# Patient Record
Sex: MALE | Race: WHITE | NOT HISPANIC OR LATINO | Employment: STUDENT | URBAN - METROPOLITAN AREA
[De-identification: names, ages, dates, MRNs, and addresses within clinical notes are randomized per-mention and may not be internally consistent; named-entity substitution may affect disease eponyms.]

---

## 2018-05-29 ENCOUNTER — HOSPITAL ENCOUNTER (EMERGENCY)
Facility: HOSPITAL | Age: 23
Discharge: HOME | End: 2018-05-29
Attending: EMERGENCY MEDICINE

## 2018-05-29 VITALS
HEART RATE: 66 BPM | TEMPERATURE: 98.4 F | OXYGEN SATURATION: 98 % | WEIGHT: 155.87 LBS | SYSTOLIC BLOOD PRESSURE: 165 MMHG | DIASTOLIC BLOOD PRESSURE: 90 MMHG | RESPIRATION RATE: 18 BRPM

## 2018-05-29 DIAGNOSIS — L72.9 FOLLICULAR CYST OF SKIN: Primary | ICD-10-CM

## 2018-05-29 PROCEDURE — 99282 EMERGENCY DEPT VISIT SF MDM: CPT

## 2018-05-29 RX ORDER — DOXYCYCLINE HYCLATE 100 MG/1
100 CAPSULE ORAL 2 TIMES DAILY
Qty: 14 CAPSULE | Refills: 0 | Status: SHIPPED | OUTPATIENT
Start: 2018-05-29 | End: 2018-06-05

## 2018-05-29 NOTE — ED PROVIDER NOTES
History  Chief Complaint   Patient presents with    Cyst     pt presents ambulatory with c/o pilonoidal cyst x 2 months  reports it is draining      HPI patient is a 51-year-old male, he reports the tender area and areas tailbone, reports over the last 2 months he noticed a pimple type structure there, he reports now it is draining  Patient reports he got some purulent material out of it on his underwear  He denies any fever or chills  Denies any pain with sitting in that area  Denies any fullness around his rectum  Denies any perirectal swelling  Past medical history previously healthy  Family history noncontributory  Social history nonsmoker no history of drug abuse  None       History reviewed  No pertinent past medical history  History reviewed  No pertinent surgical history  History reviewed  No pertinent family history  I have reviewed and agree with the history as documented  Social History   Substance Use Topics    Smoking status: Never Smoker    Smokeless tobacco: Never Used    Alcohol use Not on file        Review of Systems   Constitutional: Negative for fever  HENT: Negative for congestion  Eyes: Negative for pain and redness  Respiratory: Negative for cough and shortness of breath  Cardiovascular: Negative for chest pain  Gastrointestinal: Negative for abdominal pain and vomiting  Skin: Negative for color change and pallor  Patient reports a lump on his tailbone    Physical Exam  Physical Exam   Constitutional: He is oriented to person, place, and time  He appears well-developed and well-nourished  HENT:   Head: Normocephalic  Right Ear: External ear normal    Left Ear: External ear normal    Nose: Nose normal    Mouth/Throat: Oropharynx is clear and moist    Eyes: EOM and lids are normal  Pupils are equal, round, and reactive to light  Neck: Normal range of motion  Neck supple  Pulmonary/Chest: Effort normal  No respiratory distress     Musculoskeletal: Normal range of motion  He exhibits no deformity  Neurological: He is alert and oriented to person, place, and time  Skin: Skin is warm and dry  There is a small follicular pimple type structure less than 1 cm x 1 cm on the patient's pilonidal area, no abscess palpable, area is completely draining, discussed with patient consistent with a follicular cyst, no sign of cellulitis  No sign of perirectal abscess  Psychiatric: He has a normal mood and affect  Nursing note and vitals reviewed  Vital Signs  ED Triage Vitals [05/29/18 1250]   Temperature Pulse Respirations Blood Pressure SpO2   98 4 °F (36 9 °C) 66 18 165/90 98 %      Temp Source Heart Rate Source Patient Position - Orthostatic VS BP Location FiO2 (%)   Oral Monitor -- -- --      Pain Score       --           Vitals:    05/29/18 1250   BP: 165/90   Pulse: 66       Visual Acuity      ED Medications  Medications - No data to display    Diagnostic Studies  Results Reviewed     None                 No orders to display              Procedures  Procedures       Phone Contacts  ED Phone Contact    ED Course                               MDM medical decision making 61-year-old male presents with a pimple type structure on his pilonidal area consistent with a small follicular cyst, there is good drainage, discussed treatment with antibiotics, discussed warm soaking, discussed follow-up with surgery for excision if needed  No indication for incision and drainage at this time  CritCare Time    Disposition  Final diagnoses: Follicular cyst of skin     Time reflects when diagnosis was documented in both MDM as applicable and the Disposition within this note     Time User Action Codes Description Comment    5/29/2018 12:54 PM Laura Roland Add [X03 8] Follicular cyst of skin       ED Disposition     ED Disposition Condition Comment    Discharge  Mayra Brooks discharge to home/self care      Condition at discharge: Good        Follow-up Information Follow up With Specialties Details Why MD [facility]   [address] [facility] [address] [address] [phone]            Discharge Medication List as of 5/29/2018 12:55 PM      START taking these medications    Details   doxycycline hyclate (VIBRAMYCIN) 100 mg capsule Take 1 capsule (100 mg total) by mouth 2 (two) times a day for 7 days, Starting Tue 5/29/2018, Until Tue 6/5/2018, Print           No discharge procedures on file      ED Provider  Electronically Signed by           Wiley Perez MD  05/29/18 1640

## 2018-05-29 NOTE — DISCHARGE INSTRUCTIONS
Wash well in the shower or in the tub  Allow anything that wants to come out to come out  Doxycycline twice daily to prevent reinfection or cellulitis  Return increasing redness swelling or can follow up with General surgery for cyst removal         Cyst   WHAT YOU NEED TO KNOW:   A cyst is a round, firm lump found almost anywhere on your body  Cysts may grow slowly but are not cancerous  Treatment is not needed if you have no symptoms  Cysts can be opened and drained if they become infected or cause problems  Cysts can grow larger and make it hard for you to do your daily activities  You may also need antibiotics if there is an infection  You may need surgery to remove the cyst completely  DISCHARGE INSTRUCTIONS:   Medicines:   · Antibiotics  may be given to treat or prevent an infection  · Take your medicine as directed  Contact your healthcare provider if you think your medicine is not helping or if you have side effects  Tell him of her if you are allergic to any medicine  Keep a list of the medicines, vitamins, and herbs you take  Include the amounts, and when and why you take them  Bring the list or the pill bottles to follow-up visits  Carry your medicine list with you in case of an emergency  Return to the emergency department if:   · You develop a fever  · The area around your cyst becomes swollen, red, and painful  · Your cyst continues to drain for 2 days after you start antibiotics  Care for your wound as directed: If you have had your cyst drained or removed, care for your wound as directed  Carefully wash the wound with soap and water  Dry the area and put on new, clean bandages as directed  Change your bandages when they get wet or dirty  Follow up with your healthcare provider as directed: Your wound may need to be checked if your cyst was removed in the emergency department  You may need to see a surgeon if your cyst could not be removed   Write down your questions so you remember to ask during your visits  © 2017 [address] [employer] Information is for End User's use only and may not be sold, redistributed or otherwise used for commercial purposes  All illustrations and images included in CareNotes® are the copyrighted property of A D A M [employer] Inc  or Junito Taylor  The above information is an  only  It is not intended as medical advice for individual conditions or treatments  Talk to your doctor, nurse or pharmacist before following any medical regimen to see if it is safe and effective for you

## 2023-08-29 ENCOUNTER — HOSPITAL ENCOUNTER (EMERGENCY)
Facility: HOSPITAL | Age: 28
Discharge: HOME | End: 2023-08-29
Attending: EMERGENCY MEDICINE | Admitting: EMERGENCY MEDICINE

## 2023-08-29 VITALS
RESPIRATION RATE: 17 BRPM | SYSTOLIC BLOOD PRESSURE: 168 MMHG | OXYGEN SATURATION: 100 % | TEMPERATURE: 98 F | WEIGHT: 182 LBS | HEART RATE: 90 BPM | DIASTOLIC BLOOD PRESSURE: 96 MMHG

## 2023-08-29 DIAGNOSIS — S05.02XA ABRASION OF LEFT CORNEA, INITIAL ENCOUNTER: Primary | ICD-10-CM

## 2023-08-29 PROCEDURE — 99283 EMERGENCY DEPT VISIT LOW MDM: CPT

## 2023-08-29 PROCEDURE — NC001 PR NO CHARGE

## 2023-08-29 PROCEDURE — 99284 EMERGENCY DEPT VISIT MOD MDM: CPT

## 2023-08-29 RX ORDER — TETRACAINE HYDROCHLORIDE 5 MG/ML
1 SOLUTION OPHTHALMIC ONCE
Status: COMPLETED | OUTPATIENT
Start: 2023-08-29 | End: 2023-08-29

## 2023-08-29 RX ORDER — CIPROFLOXACIN HYDROCHLORIDE 3.5 MG/ML
2 SOLUTION/ DROPS TOPICAL 4 TIMES DAILY
Qty: 2 ML | Refills: 0 | Status: SHIPPED | OUTPATIENT
Start: 2023-08-29 | End: 2023-09-03

## 2023-08-29 RX ORDER — CIPROFLOXACIN HYDROCHLORIDE 3.5 MG/ML
2 SOLUTION/ DROPS TOPICAL ONCE
Status: COMPLETED | OUTPATIENT
Start: 2023-08-29 | End: 2023-08-29

## 2023-08-29 RX ADMIN — FLUORESCEIN SODIUM 1 STRIP: 1 STRIP OPHTHALMIC at 08:57

## 2023-08-29 RX ADMIN — TETRACAINE HYDROCHLORIDE 1 DROP: 5 SOLUTION OPHTHALMIC at 08:57

## 2023-08-29 RX ADMIN — CIPROFLOXACIN HYDROCHLORIDE 2 DROP: 3 SOLUTION/ DROPS OPHTHALMIC at 09:38

## 2023-08-29 NOTE — ED PROVIDER NOTES
History  Chief Complaint   Patient presents with   • Eye Pain     Pt reports left eye drainage and swelling that began approx 2 hours ago. Believes the contacts he was wearing irritated it. Redness and edema noted. 35 yo male without significant PMHx who presents with complaints of left eye pain, vision changes, and tearing. Patient states he noticed early this morning that he felt like there was "something in his eye". He stated he kept tearing and he felt like he had vision changes. He states he interchangeably wears eye glasses and contacts and that he had been wearing his contracts for the last 2 days. 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 He took them out when he got home this morning and about 2 hours after removing his contacts, he noticed the discomfort, tearing, and redness. He denies recent trauma to the eye. He denies sensitivity to light or pain with occular movement. He states the eye discomfort subsides when the eye is open and not moving. Patient states he is nearsighted with a prescription of -6 in both eyes, but endorses increased blurriness with center and peripheral fields in the left eye. Upon exam, the left eye has mild periorbital erythema of the surrounding skin, scleral redness/irritation, tearing, no infective discharge, red conjunctivae. No foreign body seen or obvious trauma noted. None       History reviewed. No pertinent past medical history. History reviewed. No pertinent surgical history. History reviewed. No pertinent family history. I have reviewed and agree with the history as documented. E-Cigarette/Vaping     E-Cigarette/Vaping Substances     Social History     Tobacco Use   • Smoking status: Never   • Smokeless tobacco: Never        Review of Systems   HENT:        Left eye discomfort, tearing, "feels like there's something in the eye". Increased blurriness.     All other systems reviewed and are negative. Physical Exam  ED Triage Vitals [08/29/23 0806]   Temperature Pulse Respirations Blood Pressure SpO2   98 °F (36.7 °C) 90 17 168/96 100 %      Temp Source Heart Rate Source Patient Position - Orthostatic VS BP Location FiO2 (%)   Temporal Monitor Sitting Left arm --      Pain Score       6           Orthostatic Vital Signs  Vitals:    08/29/23 0806   BP: 168/96   Pulse: 90   Patient Position - Orthostatic VS: Sitting     Physical Exam  Vitals reviewed. Constitutional:       General: He is not in acute distress. Appearance: Normal appearance. He is not ill-appearing. HENT:      Head: Normocephalic and atraumatic. Mouth/Throat:      Mouth: Mucous membranes are moist.   Eyes:      General:         Left eye: Discharge (tearing; no infective-appearing discharge) present. Extraocular Movements: Extraocular movements intact. Pupils: Pupils are equal, round, and reactive to light. Comments: Left eye: scleral erythema/irritation. No obvious trauma. Cardiovascular:      Rate and Rhythm: Normal rate. Pulses: Normal pulses. Heart sounds: No murmur heard. No friction rub. No gallop. Pulmonary:      Effort: Pulmonary effort is normal.      Breath sounds: Normal breath sounds. No wheezing, rhonchi or rales. Abdominal:      General: Bowel sounds are normal. There is no distension. Palpations: Abdomen is soft. Tenderness: There is no abdominal tenderness. There is no rebound. Musculoskeletal:         General: Normal range of motion. Cervical back: Normal range of motion and neck supple. Skin:     General: Skin is warm and dry. Capillary Refill: Capillary refill takes less than 2 seconds. Comments: Left periorbital erythema   Neurological:      General: No focal deficit present. Mental Status: He is alert and oriented to person, place, and time. Mental status is at baseline. Psychiatric:         Thought Content:  Thought content normal. Judgment: Judgment normal.       ED Medications  Medications   ciprofloxacin (CILOXAN) 0.3 % ophthalmic solution 2 drop (has no administration in time range)   fluorescein sodium sterile ophthalmic strip 1 strip (1 strip Left Eye Given 8/29/23 0857)   tetracaine 0.5 % ophthalmic solution 1 drop (1 drop Left Eye Given 8/29/23 0857)       Diagnostic Studies  Results Reviewed     None                 No orders to display         Procedures  Procedures      ED Course                             SBIRT 22yo+    Flowsheet Row Most Recent Value   Initial Alcohol Screen: US AUDIT-C     1. How often do you have a drink containing alcohol? 0 Filed at: 08/29/2023 0809   2. How many drinks containing alcohol do you have on a typical day you are drinking? 0 Filed at: 08/29/2023 0809   3a. Male UNDER 65: How often do you have five or more drinks on one occasion? 0 Filed at: 08/29/2023 0809   3b. FEMALE Any Age, or MALE 65+: How often do you have 4 or more drinks on one occassion? 0 Filed at: 08/29/2023 0809   Audit-C Score 0 Filed at: 08/29/2023 6799   AILIN: How many times in the past year have you. .. Used an illegal drug or used a prescription medication for non-medical reasons? Never Filed at: 08/29/2023 0809                Medical Decision Making  DDX: Conjunctivitis, corneal abrasion, corneal ulcer, viral infection, fungal infection. ED course:  - Tetracaine opthalmic eye drops  - Eye exam with fluorescin  - Occular pressure test    Interpretation: Occular pressure 18mmHg of left eye; WDL. Corneal abrasion noted over the left pupil. No other abrasions noted upon fluorescin exam.    Final ddx and recommendations: Corneal abrasion of the left eye. Recommend ciprofloxacin ophthalmic drops, 2 drops to the left eye 4 times a day for 5 days. Recommend calling [facility] to make appointment for follow up of abrasion.  Recs to avoid wearing contacts until clearance by Ophthalmologist. Can return to work at patient's discretion. Risk  Prescription drug management. Disposition  Final diagnoses:   Abrasion of left cornea, initial encounter     Time reflects when diagnosis was documented in both MDM as applicable and the Disposition within this note     Time User Action Codes Description Comment    8/29/2023  9:10 AM Aleja Chavez Add [S05. 02XA] Abrasion of left cornea, initial encounter     8/29/2023  9:15 AM Kimo Barrera Add [S05.00XA] Corneal abrasion     8/29/2023  9:15 AM Kimo Barrera Remove [S05.00XA] Corneal abrasion       ED Disposition     ED Disposition   Discharge    Condition   Stable    Date/Time   Tue Aug 29, 2023  9:10 AM    Comment   Sofiya Mai discharge to home/self care. Follow-up Information     Follow up With Specialties Details Why Contact Info Additional Information    Odette Patel MD [address] [address]  [phone]       [address] [place] [address] Ophthalmology Call today For corneal abrasion follow up. [address] [place] [address] Emergency Department Emergency Medicine Go to  If symptoms worsen [address] Emergency Department, [place], [place], [address]          Patient's Medications   Discharge Prescriptions    CIPROFLOXACIN (CILOXAN) 0.3 % OPHTHALMIC SOLUTION    Administer 2 drops into the left eye 4 (four) times a day for 5 days       Start Date: 8/29/2023 End Date: 9/3/2023       Order Dose: 2 drops       Quantity: 2 mL    Refills: 0     No discharge procedures on file. PDMP Review     None           ED Provider  Attending physically available and evaluated Sofiya Mai. I managed the patient along with the ED Attending.     Electronically Signed by    Jonas Castillo, [address] [facility], [address] [facility]  08/29/23 7152

## 2023-08-29 NOTE — ED ATTENDING ATTESTATION
2023  IShavon DO, saw and evaluated the patient. I have discussed the patient with the resident/non-physician practitioner and agree with the resident's/non-physician practitioner's findings, Plan of Care, and MDM as documented in the resident's/non-physician practitioner's note, except where noted. All available labs and Radiology studies were reviewed. I was present for key portions of any procedure(s) performed by the resident/non-physician practitioner and I was immediately available to provide assistance. At this point I agree with the current assessment done in the Emergency Department. I have conducted an independent evaluation of this patient a history and physical is as follows:    33 y/o M woke up this AM, felt like there was something in his left eye. Had some discomfort, blurry vision, teary. Mom irrigated it with water. Uses contacts every now and then, they are old, possibly . Does not recall any eye trauma. Works the night shift, bore them all night, after he took them off he started having the pain. Upon examination, patient overall appears well, not in acute distress, has left high conjunctival injection, no pain with extraocular movement, is teary-eyed, no visual field deficit, pupils equal round reactive to light, no consensual photophobia. Upon fluorescein stain examination and evaluation with Carney lamp, a corneal abrasion is noted around the 6 o'clock position. Intraocular pressure is 18 mmHg. His pain improves with tetracaine drop. Patient given antibiotic eyedrops, advised not to wear contacts while the eye is healing. Advise close follow-up with ophthalmology, return precautions discussed.         ED Course         Critical Care Time  Procedures

## 2024-10-09 ENCOUNTER — APPOINTMENT (OUTPATIENT)
Dept: URGENT CARE | Facility: CLINIC | Age: 29
End: 2024-10-09